# Patient Record
Sex: MALE | Race: BLACK OR AFRICAN AMERICAN | Employment: OTHER | URBAN - METROPOLITAN AREA
[De-identification: names, ages, dates, MRNs, and addresses within clinical notes are randomized per-mention and may not be internally consistent; named-entity substitution may affect disease eponyms.]

---

## 2019-01-26 ENCOUNTER — HOSPITAL ENCOUNTER (OUTPATIENT)
Dept: LAB | Age: 64
Discharge: HOME | End: 2019-01-26
Payer: COMMERCIAL

## 2019-01-26 DIAGNOSIS — Z01.818 PREOP EXAMINATION: ICD-10-CM

## 2019-01-26 DIAGNOSIS — E11.9 DIABETES MELLITUS WITHOUT COMPLICATION (HCC): ICD-10-CM

## 2019-01-26 DIAGNOSIS — R22.1 NECK MASS: ICD-10-CM

## 2019-01-26 LAB
ATRIAL RATE: 68 BPM
CALCULATED P AXIS, ECG09: 46 DEGREES
CALCULATED R AXIS, ECG10: 9 DEGREES
CALCULATED T AXIS, ECG11: 14 DEGREES
DIAGNOSIS, 93000: NORMAL
P-R INTERVAL, ECG05: 162 MS
Q-T INTERVAL, ECG07: 372 MS
QRS DURATION, ECG06: 98 MS
QTC CALCULATION (BEZET), ECG08: 395 MS
VENTRICULAR RATE, ECG03: 68 BPM

## 2019-01-26 PROCEDURE — 93005 ELECTROCARDIOGRAM TRACING: CPT

## 2019-01-29 ENCOUNTER — HOSPITAL ENCOUNTER (OUTPATIENT)
Dept: LAB | Age: 64
Discharge: HOME | End: 2019-01-29
Payer: COMMERCIAL

## 2019-01-29 PROCEDURE — 88304 TISSUE EXAM BY PATHOLOGIST: CPT

## 2019-01-30 PROCEDURE — 88307 TISSUE EXAM BY PATHOLOGIST: CPT

## 2019-09-04 ENCOUNTER — APPOINTMENT (OUTPATIENT)
Dept: GENERAL RADIOLOGY | Age: 64
End: 2019-09-04
Attending: PHYSICIAN ASSISTANT
Payer: COMMERCIAL

## 2019-09-04 ENCOUNTER — HOSPITAL ENCOUNTER (EMERGENCY)
Age: 64
Discharge: HOME | End: 2019-09-04
Attending: EMERGENCY MEDICINE
Payer: COMMERCIAL

## 2019-09-04 ENCOUNTER — APPOINTMENT (OUTPATIENT)
Dept: CT IMAGING | Age: 64
End: 2019-09-04
Attending: PHYSICIAN ASSISTANT
Payer: COMMERCIAL

## 2019-09-04 VITALS
TEMPERATURE: 98.1 F | OXYGEN SATURATION: 97 % | HEART RATE: 78 BPM | DIASTOLIC BLOOD PRESSURE: 64 MMHG | SYSTOLIC BLOOD PRESSURE: 132 MMHG | RESPIRATION RATE: 17 BRPM

## 2019-09-04 DIAGNOSIS — R55 NEAR SYNCOPE: Primary | ICD-10-CM

## 2019-09-04 LAB
ALBUMIN SERPL-MCNC: 3.4 G/DL (ref 3.4–5)
ALBUMIN/GLOB SERPL: 0.9 {RATIO} (ref 0.8–1.7)
ALP SERPL-CCNC: 101 U/L (ref 45–117)
ALT SERPL-CCNC: 30 U/L (ref 16–61)
ANION GAP SERPL CALC-SCNC: 4 MMOL/L (ref 3–18)
APPEARANCE UR: CLEAR
AST SERPL-CCNC: 16 U/L (ref 10–38)
BACTERIA URNS QL MICRO: ABNORMAL /HPF
BASOPHILS # BLD: 0 K/UL (ref 0–0.1)
BASOPHILS NFR BLD: 0 % (ref 0–2)
BILIRUB SERPL-MCNC: 0.2 MG/DL (ref 0.2–1)
BILIRUB UR QL: NEGATIVE
BUN SERPL-MCNC: 16 MG/DL (ref 7–18)
BUN/CREAT SERPL: 15 (ref 12–20)
CALCIUM SERPL-MCNC: 8.2 MG/DL (ref 8.5–10.1)
CHLORIDE SERPL-SCNC: 109 MMOL/L (ref 100–111)
CO2 SERPL-SCNC: 27 MMOL/L (ref 21–32)
COLOR UR: YELLOW
CREAT SERPL-MCNC: 1.1 MG/DL (ref 0.6–1.3)
DIFFERENTIAL METHOD BLD: ABNORMAL
EOSINOPHIL # BLD: 0.4 K/UL (ref 0–0.4)
EOSINOPHIL NFR BLD: 9 % (ref 0–5)
EPITH CASTS URNS QL MICRO: ABNORMAL /LPF (ref 0–5)
ERYTHROCYTE [DISTWIDTH] IN BLOOD BY AUTOMATED COUNT: 12.6 % (ref 11.6–14.5)
GLOBULIN SER CALC-MCNC: 3.7 G/DL (ref 2–4)
GLUCOSE SERPL-MCNC: 172 MG/DL (ref 74–99)
GLUCOSE UR STRIP.AUTO-MCNC: 250 MG/DL
HCT VFR BLD AUTO: 40.1 % (ref 36–48)
HGB BLD-MCNC: 13.7 G/DL (ref 13–16)
HGB UR QL STRIP: NEGATIVE
KETONES UR QL STRIP.AUTO: ABNORMAL MG/DL
LEUKOCYTE ESTERASE UR QL STRIP.AUTO: NEGATIVE
LYMPHOCYTES # BLD: 1.9 K/UL (ref 0.9–3.6)
LYMPHOCYTES NFR BLD: 40 % (ref 21–52)
MCH RBC QN AUTO: 28.4 PG (ref 24–34)
MCHC RBC AUTO-ENTMCNC: 34.2 G/DL (ref 31–37)
MCV RBC AUTO: 83 FL (ref 74–97)
MONOCYTES # BLD: 0.3 K/UL (ref 0.05–1.2)
MONOCYTES NFR BLD: 7 % (ref 3–10)
NEUTS SEG # BLD: 2.1 K/UL (ref 1.8–8)
NEUTS SEG NFR BLD: 44 % (ref 40–73)
NITRITE UR QL STRIP.AUTO: NEGATIVE
PH UR STRIP: 7 [PH] (ref 5–8)
PLATELET # BLD AUTO: 249 K/UL (ref 135–420)
PMV BLD AUTO: 10.1 FL (ref 9.2–11.8)
POTASSIUM SERPL-SCNC: 3.9 MMOL/L (ref 3.5–5.5)
PROT SERPL-MCNC: 7.1 G/DL (ref 6.4–8.2)
PROT UR STRIP-MCNC: 30 MG/DL
RBC # BLD AUTO: 4.83 M/UL (ref 4.7–5.5)
RBC #/AREA URNS HPF: ABNORMAL /HPF (ref 0–5)
SODIUM SERPL-SCNC: 140 MMOL/L (ref 136–145)
SP GR UR REFRACTOMETRY: >1.03 (ref 1–1.03)
TROPONIN I SERPL-MCNC: <0.02 NG/ML (ref 0–0.04)
TROPONIN I SERPL-MCNC: <0.02 NG/ML (ref 0–0.04)
UROBILINOGEN UR QL STRIP.AUTO: 1 EU/DL (ref 0.2–1)
WBC # BLD AUTO: 4.7 K/UL (ref 4.6–13.2)
WBC URNS QL MICRO: ABNORMAL /HPF (ref 0–4)

## 2019-09-04 PROCEDURE — 80053 COMPREHEN METABOLIC PANEL: CPT

## 2019-09-04 PROCEDURE — 74011250636 HC RX REV CODE- 250/636: Performed by: PHYSICIAN ASSISTANT

## 2019-09-04 PROCEDURE — 81001 URINALYSIS AUTO W/SCOPE: CPT

## 2019-09-04 PROCEDURE — 71045 X-RAY EXAM CHEST 1 VIEW: CPT

## 2019-09-04 PROCEDURE — 93005 ELECTROCARDIOGRAM TRACING: CPT

## 2019-09-04 PROCEDURE — 85025 COMPLETE CBC W/AUTO DIFF WBC: CPT

## 2019-09-04 PROCEDURE — 99285 EMERGENCY DEPT VISIT HI MDM: CPT

## 2019-09-04 PROCEDURE — 70450 CT HEAD/BRAIN W/O DYE: CPT

## 2019-09-04 PROCEDURE — 84484 ASSAY OF TROPONIN QUANT: CPT

## 2019-09-04 RX ADMIN — SODIUM CHLORIDE 1000 ML: 900 INJECTION, SOLUTION INTRAVENOUS at 20:59

## 2019-09-04 NOTE — ED TRIAGE NOTES
Patient came in by medic and stated he felt dizzy and his heart started racing. He said his heart may of started to race because he was scared of feeling dizzy.

## 2019-09-05 LAB
ATRIAL RATE: 88 BPM
CALCULATED P AXIS, ECG09: 26 DEGREES
CALCULATED R AXIS, ECG10: 0 DEGREES
CALCULATED T AXIS, ECG11: 12 DEGREES
DIAGNOSIS, 93000: NORMAL
P-R INTERVAL, ECG05: 160 MS
Q-T INTERVAL, ECG07: 338 MS
QRS DURATION, ECG06: 94 MS
QTC CALCULATION (BEZET), ECG08: 408 MS
VENTRICULAR RATE, ECG03: 88 BPM

## 2019-09-05 NOTE — ED PROVIDER NOTES
EMERGENCY DEPARTMENT HISTORY AND PHYSICAL EXAM    8:03 PM      Date: 2019  Patient Name: Catrina Samaniego    History of Presenting Illness     Chief Complaint   Patient presents with    Dizziness         History Provided By: Patient    Chief Complaint: Lightheadedness      Additional History (Context): Catrina Samaniego is a 61 y.o. male with hyperlipidemia and TIA who presents following a transient episode of lightheadedness while talking on the phone approximately 2-hours ago, which lasted only 1-2 seconds. Patient reports rapid palpitations immediately after and states, \"I was probably just scared because I thought I was going to pass out. \" Patient states he experienced an identical episode several months ago. He also reports a history of TIA in  and . Patient states he is supposed to take a daily statin, but states, \"I only take it maybe every-other day, because I don't like the way it makes me feel. \" Patient attests to drinking 4-cups of coffee per day. Patient denies loss of consciousness, room-spinning dizziness, chest pain, shortness of breath, fever, nausea, vomiting, diaphoresis, abdominal pain, headache, and any urinary complaints. PCP: Armaan Montanez MD    Current Outpatient Medications   Medication Sig Dispense Refill    metformin HCl (METFORMIN PO) Take  by mouth.  GLIMEPIRIDE PO Take  by mouth. Past History     Past Medical History:  Past Medical History:   Diagnosis Date    Diabetes ([address]     Type 2    TIA (transient ischemic attack) 11/15/2018       Past Surgical History:  Past Surgical History:   Procedure Laterality Date    HX TUMOR REMOVAL N/A 2018    Benign tumor removed from neck       Family History:  History reviewed. No pertinent family history.     Social History:  Social History     Tobacco Use    Smoking status: Former Smoker     Packs/day: 0.25     Last attempt to quit: 1980     Years since quittin.0    Smokeless tobacco: Never Used   Substance Use Topics    Alcohol use: Never     Frequency: Never    Drug use: Never       Allergies: Allergies   Allergen Reactions    Simvastatin Nausea Only         Review of Systems       Review of Systems   Constitutional: Negative for diaphoresis and fever. HENT: Negative for facial swelling. Eyes: Negative for visual disturbance. Respiratory: Negative for shortness of breath. Cardiovascular: Positive for palpitations. Negative for chest pain. Gastrointestinal: Negative for abdominal pain, nausea and vomiting. Genitourinary: Negative for dysuria. Musculoskeletal: Negative for neck pain. Skin: Negative for rash. Neurological: Positive for light-headedness (transient). Negative for dizziness, weakness, numbness and headaches. Psychiatric/Behavioral: Negative for confusion. All other systems reviewed and are negative. Physical Exam     Visit Vitals  /64   Pulse 77   Temp 98.1 °F (36.7 °C)   Resp 17   SpO2 97%         Physical Exam   Constitutional: He is oriented to person, place, and time. He appears well-developed and well-nourished. No distress. HENT:   Head: Normocephalic and atraumatic. Eyes: Pupils are equal, round, and reactive to light. Conjunctivae are normal.   Neck: Normal range of motion. Neck supple. Cardiovascular: Normal rate, regular rhythm and normal heart sounds. Exam reveals no gallop and no friction rub. No murmur heard. Pulmonary/Chest: Effort normal and breath sounds normal. No respiratory distress. He has no wheezes. He has no rales. Abdominal: Soft. Bowel sounds are normal. He exhibits no distension and no mass. There is no tenderness. There is no rebound and no guarding. Musculoskeletal: Normal range of motion. Neurological: He is alert and oriented to person, place, and time. He has normal strength. He displays a negative Romberg sign. No pronator drift. Symmetric faces. No slurring of speech. CN II-XII intact. Finger-nose-finger without dysmetria. Heel-to-shin intact. Skin: Skin is warm and dry. He is not diaphoretic. Psychiatric: He has a normal mood and affect. Nursing note and vitals reviewed. Diagnostic Study Results     Labs -  Recent Results (from the past 12 hour(s))   EKG, 12 LEAD, INITIAL    Collection Time: 09/04/19  8:22 PM   Result Value Ref Range    Ventricular Rate 88 BPM    Atrial Rate 88 BPM    P-R Interval 160 ms    QRS Duration 94 ms    Q-T Interval 338 ms    QTC Calculation (Bezet) 408 ms    Calculated P Axis 26 degrees    Calculated R Axis 0 degrees    Calculated T Axis 12 degrees    Diagnosis       Sinus rhythm with premature atrial complexes  Otherwise normal ECG  When compared with ECG of 26-JAN-2019 10:22,  premature atrial complexes are now present     CBC WITH AUTOMATED DIFF    Collection Time: 09/04/19  8:49 PM   Result Value Ref Range    WBC 4.7 4.6 - 13.2 K/uL    RBC 4.83 4.70 - 5.50 M/uL    HGB 13.7 13.0 - 16.0 g/dL    HCT 40.1 36.0 - 48.0 %    MCV 83.0 74.0 - 97.0 FL    MCH 28.4 24.0 - 34.0 PG    MCHC 34.2 31.0 - 37.0 g/dL    RDW 12.6 11.6 - 14.5 %    PLATELET 400 947 - 606 K/uL    MPV 10.1 9.2 - 11.8 FL    NEUTROPHILS 44 40 - 73 %    LYMPHOCYTES 40 21 - 52 %    MONOCYTES 7 3 - 10 %    EOSINOPHILS 9 (H) 0 - 5 %    BASOPHILS 0 0 - 2 %    ABS. NEUTROPHILS 2.1 1.8 - 8.0 K/UL    ABS. LYMPHOCYTES 1.9 0.9 - 3.6 K/UL    ABS. MONOCYTES 0.3 0.05 - 1.2 K/UL    ABS. EOSINOPHILS 0.4 0.0 - 0.4 K/UL    ABS.  BASOPHILS 0.0 0.0 - 0.1 K/UL    DF AUTOMATED     METABOLIC PANEL, COMPREHENSIVE    Collection Time: 09/04/19  8:49 PM   Result Value Ref Range    Sodium 140 136 - 145 mmol/L    Potassium 3.9 3.5 - 5.5 mmol/L    Chloride 109 100 - 111 mmol/L    CO2 27 21 - 32 mmol/L    Anion gap 4 3.0 - 18 mmol/L    Glucose 172 (H) 74 - 99 mg/dL    BUN 16 7.0 - 18 MG/DL    Creatinine 1.10 0.6 - 1.3 MG/DL    BUN/Creatinine ratio 15 12 - 20      GFR est AA >60 >60 ml/min/1.73m2    GFR est non-AA >60 >60 ml/min/1.73m2    Calcium 8.2 (L) 8.5 - 10.1 MG/DL    Bilirubin, total 0.2 0.2 - 1.0 MG/DL    ALT (SGPT) 30 16 - 61 U/L    AST (SGOT) 16 10 - 38 U/L    Alk. phosphatase 101 45 - 117 U/L    Protein, total 7.1 6.4 - 8.2 g/dL    Albumin 3.4 3.4 - 5.0 g/dL    Globulin 3.7 2.0 - 4.0 g/dL    A-G Ratio 0.9 0.8 - 1.7     TROPONIN I    Collection Time: 09/04/19  8:49 PM   Result Value Ref Range    Troponin-I, QT <0.02 0.0 - 0.045 NG/ML   URINALYSIS W/ RFLX MICROSCOPIC    Collection Time: 09/04/19  9:06 PM   Result Value Ref Range    Color YELLOW      Appearance CLEAR      Specific gravity >1.030 (H) 1.005 - 1.030    pH (UA) 7.0 5.0 - 8.0      Protein 30 (A) NEG mg/dL    Glucose 250 (A) NEG mg/dL    Ketone TRACE (A) NEG mg/dL    Bilirubin NEGATIVE  NEG      Blood NEGATIVE  NEG      Urobilinogen 1.0 0.2 - 1.0 EU/dL    Nitrites NEGATIVE  NEG      Leukocyte Esterase NEGATIVE  NEG     URINE MICROSCOPIC ONLY    Collection Time: 09/04/19  9:06 PM   Result Value Ref Range    WBC NONE 0 - 4 /hpf    RBC NONE 0 - 5 /hpf    Epithelial cells FEW 0 - 5 /lpf    Bacteria FEW (A) NEG /hpf   TROPONIN I    Collection Time: 09/04/19  9:55 PM   Result Value Ref Range    Troponin-I, QT <0.02 0.0 - 0.045 NG/ML       Radiologic Studies -   CT HEAD WO CONT   Final Result   IMPRESSION:      No CT findings of acute infarct. No acute hemorrhage or mass. Chronic microvascular ischemic changes including periventricular white matter   lucencies and chronic deep brain lacunar infarcts. Cerebellar volume loss appears out of proportion to cerebral volume loss. XR CHEST PORT    (Results Pending)         Medical Decision Making   I am the first provider for this patient. I reviewed the vital signs, available nursing notes, past medical history, past surgical history, family history and social history. Vital Signs-Reviewed the patient's vital signs.       Records Reviewed: Nursing Notes (Time of Review: 8:03 PM)    ED Course: Progress Notes, Reevaluation, and Consults:      Provider Notes (Medical Decision Making): MDM  Number of Diagnoses or Management Options  Near syncope:   Diagnosis management comments: 63-y.o. Male with hx of TIA presents with transient episode of lightheadedness. Will order CBC, CMP, 12-lead EKG, UA, CT-head, CXR, and reassess. DDX: Cardiac Arrhythmia, Pulmonary Embolism, TIA, CVA, Seizures, Dehydration, Aortic Stenosis, Hypoglycemia, Subarachnoid hemorrhage, GI Hemorrhage, Adrenal insufficiency, Sepsis, Hypotension. 11:04 PM  Labs have resulted normal including EKG and CT head. I have very low suspicion of CVA or PE or ACS due to brief time. Of symptoms and sudden onset. He has been asymptomatic in the ED and has normal vital signs. Neuro neuro exam is normal.  Recommend follow-up with PCP. Discussed treatment plan, return precautions, symptomatic relief, and expected time to improvement. All questions answered. Patient is stable for discharge and outpatient management. Diagnosis     Clinical Impression:   1. Near syncope        Disposition: Discharged      Follow-up Information     Follow up With Specialties Details Why Contact Info    Ya Covarrubias MD Family Practice Schedule an appointment as soon as possible for a visit  [address] [facility] EMERGENCY DEPT Emergency Medicine  Immediately if symptoms worsen [address] [id number]  [phone]           Patient's Medications   Start Taking    No medications on file   Continue Taking    GLIMEPIRIDE PO    Take  by mouth. METFORMIN HCL (METFORMIN PO)    Take  by mouth.    These Medications have changed    No medications on file   Stop Taking    No medications on file     _______________________________    Attestations:  Sonya Fairchild PA-C acting as a scribe for and in the presence of JOSE ANGEL/LEV Petty      September 04, 2019 at 11:05 PM       Provider Attestation:      I personally performed the services described in the documentation, reviewed the documentation, as recorded by the scribe in my presence, and it accurately and completely records my words and actions.  September 04, 2019 at 11:05 PM - Javier Mcghee PA-C  _______________________________

## 2019-09-05 NOTE — DISCHARGE INSTRUCTIONS
Patient Education        Lightheadedness or Faintness: Care Instructions  Your Care Instructions  Lightheadedness is a feeling that you are about to faint or \"pass out. \" You do not feel as if you or your surroundings are moving. It is different from vertigo, which is the feeling that you or things around you are spinning or tilting. Lightheadedness usually goes away or gets better when you lie down. If lightheadedness gets worse, it can lead to a fainting spell. It is common to feel lightheaded from time to time. Lightheadedness usually is not caused by a serious problem. It often is caused by a short-lasting drop in blood pressure and blood flow to your head that occurs when you get up too quickly from a seated or lying position. Follow-up care is a key part of your treatment and safety. Be sure to make and go to all appointments, and call your doctor if you are having problems. It's also a good idea to know your test results and keep a list of the medicines you take. How can you care for yourself at home? · Lie down for 1 or 2 minutes when you feel lightheaded. After lying down, sit up slowly and remain sitting for 1 to 2 minutes before slowly standing up. · Avoid movements, positions, or activities that have made you lightheaded in the past.  · Get plenty of rest, especially if you have a cold or flu, which can cause lightheadedness. · Make sure you drink plenty of fluids, especially if you have a fever or have been sweating. · Do not drive or put yourself and others in danger while you feel lightheaded. When should you call for help? Call 911 anytime you think you may need emergency care. For example, call if:    · You have symptoms of a stroke. These may include:  ? Sudden numbness, tingling, weakness, or loss of movement in your face, arm, or leg, especially on only one side of your body. ? Sudden vision changes. ? Sudden trouble speaking.   ? Sudden confusion or trouble understanding simple statements. ? Sudden problems with walking or balance. ? A sudden, severe headache that is different from past headaches.     · You have symptoms of a heart attack. These may include:  ? Chest pain or pressure, or a strange feeling in the chest.  ? Sweating. ? Shortness of breath. ? Nausea or vomiting. ? Pain, pressure, or a strange feeling in the back, neck, jaw, or upper belly or in one or both shoulders or arms. ? Lightheadedness or sudden weakness. ? A fast or irregular heartbeat. After you call 911, the  may tell you to chew 1 adult-strength or 2 to 4 low-dose aspirin. Wait for an ambulance. Do not try to drive yourself.    Watch closely for changes in your health, and be sure to contact your doctor if:    · Your lightheadedness gets worse or does not get better with home care. Where can you learn more? Go to http://zeyad-curry.info/. Enter T025 in the search box to learn more about \"Lightheadedness or Faintness: Care Instructions. \"  Current as of: September 23, 2018  Content Version: 12.1  © 5554-9754 [employer]. Care instructions adapted under license by [employer] (which disclaims liability or warranty for this information). If you have questions about a medical condition or this instruction, always ask your healthcare professional. [address] any warranty or liability for your use of this information.

## 2019-09-05 NOTE — ED NOTES
Patients son is in room with him. Patient laying in bed, does not seem to be in any distress. Respirations are even and unlabored.

## 2019-09-05 NOTE — ED NOTES
Discharge instructions reviewed with patient. Patient verbalized understanding and does not have any questions at this time. Patient ambulated out of ED with his son.